# Patient Record
Sex: FEMALE | URBAN - METROPOLITAN AREA
[De-identification: names, ages, dates, MRNs, and addresses within clinical notes are randomized per-mention and may not be internally consistent; named-entity substitution may affect disease eponyms.]

---

## 2020-12-30 ENCOUNTER — TELEPHONE (OUTPATIENT)
Dept: FAMILY MEDICINE CLINIC | Facility: CLINIC | Age: 55
End: 2020-12-30

## 2020-12-30 NOTE — TELEPHONE ENCOUNTER
[employer] reports reviewed. Patient overdue for routine wellness. Chart reviewed, patient has never been seen in our office. No phone number listed to contact the patient for scheduling.   Will mail letter to schedule annual wellness exam and establish car

## (undated) NOTE — LETTER
Date: 12/30/2020    Patient Name: Luz Rodriguez,    We have you listed as a patient of Dr. Niru Gamez with your insurance plan though we do not have record you've been seen in our office.   Please call our office to schedule a routine